# Patient Record
Sex: MALE | Race: WHITE | NOT HISPANIC OR LATINO | URBAN - METROPOLITAN AREA
[De-identification: names, ages, dates, MRNs, and addresses within clinical notes are randomized per-mention and may not be internally consistent; named-entity substitution may affect disease eponyms.]

---

## 2020-10-13 ENCOUNTER — OFFICE VISIT - HEALTHEAST (OUTPATIENT)
Dept: INTERNAL MEDICINE | Facility: CLINIC | Age: 40
End: 2020-10-13

## 2020-10-13 DIAGNOSIS — Z00.00 ROUTINE HISTORY AND PHYSICAL EXAMINATION OF ADULT: ICD-10-CM

## 2020-10-13 DIAGNOSIS — N50.812 TESTICULAR PAIN, LEFT: ICD-10-CM

## 2020-10-13 DIAGNOSIS — E66.3 OVERWEIGHT (BMI 25.0-29.9): ICD-10-CM

## 2020-10-13 ASSESSMENT — MIFFLIN-ST. JEOR: SCORE: 1850.09

## 2020-10-14 ENCOUNTER — AMBULATORY - HEALTHEAST (OUTPATIENT)
Dept: LAB | Facility: CLINIC | Age: 40
End: 2020-10-14

## 2020-10-14 DIAGNOSIS — E66.3 OVERWEIGHT (BMI 25.0-29.9): ICD-10-CM

## 2020-10-14 DIAGNOSIS — Z00.00 ROUTINE HISTORY AND PHYSICAL EXAMINATION OF ADULT: ICD-10-CM

## 2020-10-14 LAB
ALBUMIN SERPL-MCNC: 4.4 G/DL (ref 3.5–5)
ALP SERPL-CCNC: 86 U/L (ref 45–120)
ALT SERPL W P-5'-P-CCNC: 112 U/L (ref 0–45)
ANION GAP SERPL CALCULATED.3IONS-SCNC: 11 MMOL/L (ref 5–18)
AST SERPL W P-5'-P-CCNC: 45 U/L (ref 0–40)
BILIRUB SERPL-MCNC: 1 MG/DL (ref 0–1)
BUN SERPL-MCNC: 17 MG/DL (ref 8–22)
CALCIUM SERPL-MCNC: 9.8 MG/DL (ref 8.5–10.5)
CHLORIDE BLD-SCNC: 105 MMOL/L (ref 98–107)
CHOLEST SERPL-MCNC: 179 MG/DL
CO2 SERPL-SCNC: 25 MMOL/L (ref 22–31)
CREAT SERPL-MCNC: 1 MG/DL (ref 0.7–1.3)
ERYTHROCYTE [DISTWIDTH] IN BLOOD BY AUTOMATED COUNT: 10.8 % (ref 11–14.5)
FASTING STATUS PATIENT QL REPORTED: YES
GFR SERPL CREATININE-BSD FRML MDRD: >60 ML/MIN/1.73M2
GLUCOSE BLD-MCNC: 96 MG/DL (ref 70–125)
HBA1C MFR BLD: 4.8 %
HCT VFR BLD AUTO: 43.8 % (ref 40–54)
HDLC SERPL-MCNC: 50 MG/DL
HGB BLD-MCNC: 15.1 G/DL (ref 14–18)
LDLC SERPL CALC-MCNC: 107 MG/DL
MCH RBC QN AUTO: 35.6 PG (ref 27–34)
MCHC RBC AUTO-ENTMCNC: 34.6 G/DL (ref 32–36)
MCV RBC AUTO: 103 FL (ref 80–100)
PLATELET # BLD AUTO: 168 THOU/UL (ref 140–440)
PMV BLD AUTO: 7.7 FL (ref 7–10)
POTASSIUM BLD-SCNC: 4.1 MMOL/L (ref 3.5–5)
PROT SERPL-MCNC: 7.1 G/DL (ref 6–8)
RBC # BLD AUTO: 4.26 MILL/UL (ref 4.4–6.2)
SODIUM SERPL-SCNC: 141 MMOL/L (ref 136–145)
TRIGL SERPL-MCNC: 112 MG/DL
WBC: 5.3 THOU/UL (ref 4–11)

## 2020-10-15 ENCOUNTER — COMMUNICATION - HEALTHEAST (OUTPATIENT)
Dept: INTERNAL MEDICINE | Facility: CLINIC | Age: 40
End: 2020-10-15

## 2020-10-20 ENCOUNTER — COMMUNICATION - HEALTHEAST (OUTPATIENT)
Dept: INTERNAL MEDICINE | Facility: CLINIC | Age: 40
End: 2020-10-20

## 2021-06-05 VITALS
SYSTOLIC BLOOD PRESSURE: 134 MMHG | HEART RATE: 96 BPM | HEIGHT: 70 IN | TEMPERATURE: 98.5 F | DIASTOLIC BLOOD PRESSURE: 86 MMHG | BODY MASS INDEX: 29.35 KG/M2 | WEIGHT: 205 LBS | OXYGEN SATURATION: 97 %

## 2021-06-12 NOTE — TELEPHONE ENCOUNTER
Question following Office Visit  When did you see your provider: 10/13/20  What is your question: Patient would like to speak directly to Dr. Watkins about any lab results concerns.  Okay to leave a detailed message: Yes

## 2021-06-12 NOTE — PROGRESS NOTES
Office Visit - New Patient   Jake Hopper   40 y.o.  male    Date of visit: 10/13/2020  Physician: Gerson Watkins MD     Assessment and Plan   1. Routine history and physical examination of adult  2. Overweight (BMI 25.0-29.9)  - HM2(CBC w/o Differential); Future  - Comprehensive Metabolic Panel; Future  - Lipid Profile; Future  - Glycosylated Hemoglobin A1c; Future    3. Testicular pain, left  No visual or physical abnormality on exam. Low suspicion for torsion or hernia based on history and exam.  Counseled patient to first consider wearing briefs to provide support to his testicles. Malignancy is unlikely and referred pain remains on the differential.  If persists, may refer to urology, and will evaluate labs listed above.     Return if symptoms worsen or fail to improve.    Much or all of the text in this note was generated through the use of Dragon Dictate voice-to-text software. Errors in spelling or words which seem out of context are unintentional. Sound alike errors, in particular, may have escaped editing     Chief Complaint   Chief Complaint   Patient presents with     Annual Exam     not fasting - will get flu shot at pharmacy        Socioeconomic History     Marital status:      Smoking status: Current Some Day Smoker     Types: Cigarettes     Smokeless tobacco: Never Used     Tobacco comment: socially smokes   Substance and Sexual Activity     Alcohol use: Yes     Alcohol/week: 4.0 standard drinks     Types: 4 Standard drinks or equivalent per week     Frequency: 4 or more times a week     Drinks per session: 3 or 4     Binge frequency: Monthly     Drug use: Yes     Frequency: 5.0 times per week     Types: Marijuana     Sexual activity: Yes     Partners: Female     Birth control/protection: I.U.D.   Social History Narrative    . Chain smoker. 2 children. Works as a . For hobbies: [employer], plays [employer], playing video games.       Past Medical History   Patient  Active Problem List   Diagnosis     Overweight (BMI 25.0-29.9)     Past Medical History:   Diagnosis Date     Overweight (BMI 25.0-29.9)        Past Surgical History  He has a past surgical history that includes Benoit tooth extraction (Bilateral).  Family History   Problem Relation Age of Onset     Skin cancer Father      Breast cancer Paternal Grandmother         History of Present Illness   This 40 y.o. old male. Feels safe at home. Always wears a seatbelt. Main concern is  and MSK related. States there is a spot in his left lower back for the last 6 months. 1/10, periodic. Comfortable clothing and not having a belt makes it worse. Endorses about 30 lb weight gain, presumable due to less activity. Children are in their early teens. Endorses periodic left testicle discomfort, which is worse later in the day. He does not tend to wear underwear regularly. Notes that the testicle feels like it is riding. There is an associated pain in the L testicle and that there is a mild pain around the perineum. He has never seen a Urologist. He denies any concern from regular LUDA. No recent f/s/c, LE edema, no N/T in the fingers or toes.  No penile discharge or dysuria.    Review of Systems: A comprehensive review of systems was negative except as noted.     Medications and Allergies   No current outpatient medications on file.     No current facility-administered medications for this visit.      Allergies   Allergen Reactions     Thimerosal Hives and Itching        Family and Social History   Family History   Problem Relation Age of Onset     Skin cancer Father      Breast cancer Paternal Grandmother         Social History     Tobacco Use     Smoking status: Current Some Day Smoker     Types: Cigarettes     Smokeless tobacco: Never Used     Tobacco comment: socially smokes   Substance Use Topics     Alcohol use: Yes     Alcohol/week: 4.0 standard drinks     Types: 4 Standard drinks or equivalent per week     Frequency: 4 or  "more times a week     Drinks per session: 3 or 4     Binge frequency: Monthly     Drug use: Yes     Frequency: 5.0 times per week     Types: Marijuana        Physical Exam   /86 (Patient Site: Left Arm, Patient Position: Sitting, Cuff Size: Adult Regular)   Pulse 96   Temp 98.5  F (36.9  C) (Tympanic)   Ht 5' 10.25\" (1.784 m)   Wt 205 lb (93 kg)   SpO2 97%   BMI 29.21 kg/m    GENERAL APPEARANCE: Pleasant, nontoxic-appearing, NAD, alert and oriented x4  EYES: Eyelids, conjunctiva, and sclera were normal. Pupils were normal.    HEAD, EARS, NOSE, MOUTH, AND THROAT: Head normal. Hearing was normal to voice.    NECK: Neck appearance was normal.   RESPIRATORY: Bilaterally with no crackles, wheezing or rhonchi  CARDIOVASCULAR: Regular S1 and S2.  Radial pulses intact.  No edema.  GASTROINTESTINAL: NABS, soft, NT, ND, no HSM  MUSCULOSKELETAL: Skeletal configuration was normal and muscle mass was normal for age. Joint appearance was overall normal.  SKIN/HAIR/NAILS: Skin color was normal. No moles on torso or arms. Hair and nails were normal.   NEUROLOGIC: Alert and oriented to person, place, time, and circumstance. Speech was normal. Motor strength was normal for age   PSYCHIATRIC:  Mood and affect were normal   GENITOURINARY: There is no scrotal swelling, palpable testicular nodule, skin abnormalities throughout the scrotum, inguinal adenopathy, hydrocele/varicocele.  No cryptorchidism.  No inguinal adenopathy.     Additional Information   Time: total time spent with the patient was 30 minutes of which >50% was spent in counseling and coordination of care     Gerson Watkins MD  Internal Medicine  Contact me at [phone]  "

## 2021-06-18 NOTE — PATIENT INSTRUCTIONS - HE
Patient Instructions by Gerson Watkins MD at 10/13/2020 10:20 AM     Author: Gerson Watkins MD Service: -- Author Type: Physician    Filed: 10/13/2020 11:21 AM Encounter Date: 10/13/2020 Status: Signed    : Gerson Watkins MD (Physician)         Established High Blood Pressure    High blood pressure (hypertension) is a chronic disease. Often, healthcare providers dont know what causes it. But it can be caused by certain health conditions and medicines.  If you have high blood pressure, you may not have any symptoms. If you do have symptoms, they may include headache, dizziness, changes in your vision, chest pain, and shortness of breath. But even without symptoms, high blood pressure thats not treated raises your risk for heart attack, heart failure, and stroke. High blood pressure is a serious health risk and shouldnt be ignored.  Blood pressure measurements are given as 2 numbers. Systolic blood pressure is the upper number. This is the pressure when the heart contracts. Diastolic blood pressure is the lower number. This is the pressure when the heart relaxes between beats. You will see your blood pressure readings written together. For example, a person with a systolic pressure of 118 and a diastolic pressure of 78 will have 118/78 written in the medical record.  Blood pressure is categorized as normal, elevated, or stage 1 or stage 2 high blood pressure:    Normal blood pressure is systolic of less than 120 and diastolic of less than 80 (120/80)    Elevated blood pressure is systolic of 120 to 129 and diastolic less than 80    Stage 1 high blood pressure is systolic is 130 to 139 or diastolic between 80 to 89    Stage 2 high blood pressure is when systolic is 140 or higher or the diastolic is 90 or higher  Home care  If you have high blood pressure, follow these home care guidelines to help lower your blood pressure. If you are taking medicines for high blood pressure,  these methods may reduce or end your need for medicines in the future.    Start a weight-loss program if you are overweight.    Cut back on how much salt you get in your diet. Heres how to do this:  ? Dont eat foods that have a lot of salt. These include olives, pickles, smoked meats, and salted potato chips.  ? Dont add salt to your food at the table.  ? Use only small amounts of salt when cooking.    Start an exercise program. Talk with your healthcare provider about the type of exercise program that would be best for you. It doesn't have to be hard. Even brisk walking for 20 minutes 3 times a week is a good form of exercise.    Dont take medicines that stimulate the heart. This includes many over-the-counter cold and sinus decongestant pills and sprays, as well as diet pills. Check the warnings about high blood pressure on the label. Before buying any over-the-counter medicines or supplements, always ask the pharmacist about the product's potential interaction with your high blood pressure and your high blood pressure medicines.    Stimulants such as amphetamine or cocaine could be deadly for someone with high blood pressure. Never take these.    Limit how much caffeine you get in your diet. Switch to caffeine-free products.    Stop smoking. If you are a long-time smoker, this can be hard. Talk to your healthcare provider about medicines and nicotine replacement options to help you. Also, enroll in a stop-smoking program to make it more likely that you will quit for good.    Learn how to handle stress. This is an important part of any program to lower blood pressure. Learn about relaxation methods like meditation, yoga, or biofeedback.    If your provider prescribed medicines, take them exactly as directed. Missing doses may cause your blood pressure get out of control.    If you miss a dose or doses, check with your healthcare provider or pharmacist about what to do.    Consider buying an automatic blood  pressure machine to check your blood pressure at home. Ask your provider for a recommendation. You can get one of these at most pharmacies.     The American Heart Association recommends the following guidelines for home blood pressure monitoring:    Don't smoke or drink coffee for 30 minutes before taking your blood pressure.    Go to the bathroom before the test.    Relax for 5 minutes before taking the measurement.    Sit with your back supported (don't sit on a couch or soft chair); keep your feet on the floor uncrossed. Place your arm on a solid flat surface (like a table) with the upper part of the arm at heart level. Place the middle of the cuff directly above the bend of the elbow. Check the monitor's instruction manual for an illustration.    Take multiple readings. When you measure, take 2 to 3 readings one minute apart and record all of the results.    Take your blood pressure at the same time every day, or as your healthcare provider recommends.    Record the date, time, and blood pressure reading.    Take the record with you to your next medical appointment. If your blood pressure monitor has a built-in memory, simply take the monitor with you to your next appointment.    Call your provider if you have several high readings. Don't be frightened by a single high blood pressure reading, but if you get several high readings, check in with your healthcare provider.    Note: When blood pressure reaches a systolic (top number) of 180 or higher OR diastolic (bottom number) of 110 or higher, seek emergency medical treatment.  Follow-up care  You will need to see your healthcare provider regularly. This is to check your blood pressure and to make changes to your medicines. Make a follow-up appointment as directed. Bring the record of your home blood pressure readings to the appointment.  When to seek medical advice  Call your healthcare provider right away if any of these occur:    Blood pressure reaches a  systolic (upper number) of 180 or higher OR a diastolic (bottom number) of 110 or higher    Chest pain or shortness of breath    Severe headache    Throbbing or rushing sound in the ears    Nosebleed    Sudden severe pain in your belly (abdomen)    Extreme drowsiness, confusion, or fainting    Dizziness or spinning sensation (vertigo)    Weakness of an arm or leg or one side of the face    You have problems speaking or seeing

## 2021-06-21 NOTE — LETTER
Letter by Gerson Watkins MD at      Author: Gerson Watkins MD Service: -- Author Type: --    Filed:  Encounter Date: 10/20/2020 Status: (Other)         Jake Hopper  [address] [facility] [address]     October 20, 2020     Dear Mr. Hopper,    Below are the results from your recent visit:    Resulted Orders   HM2(CBC w/o Differential)   Result Value Ref Range    WBC 5.3 4.0 - 11.0 thou/uL    RBC 4.26 (L) 4.40 - 6.20 mill/uL    Hemoglobin 15.1 14.0 - 18.0 g/dL    Hematocrit 43.8 40.0 - 54.0 %     (H) 80 - 100 fL    MCH 35.6 (H) 27.0 - 34.0 pg    MCHC 34.6 32.0 - 36.0 g/dL    RDW 10.8 (L) 11.0 - 14.5 %    Platelets 168 140 - 440 thou/uL    MPV 7.7 7.0 - 10.0 fL   Comprehensive Metabolic Panel   Result Value Ref Range    Sodium 141 136 - 145 mmol/L    Potassium 4.1 3.5 - 5.0 mmol/L    Chloride 105 98 - 107 mmol/L    CO2 25 22 - 31 mmol/L    Anion Gap, Calculation 11 5 - 18 mmol/L    Glucose 96 70 - 125 mg/dL    BUN 17 8 - 22 mg/dL    Creatinine 1.00 0.70 - 1.30 mg/dL    GFR MDRD Af Amer >60 >60 mL/min/1.73m2    GFR MDRD Non Af Amer >60 >60 mL/min/1.73m2    Bilirubin, Total 1.0 0.0 - 1.0 mg/dL    Calcium 9.8 8.5 - 10.5 mg/dL    Protein, Total 7.1 6.0 - 8.0 g/dL    Albumin 4.4 3.5 - 5.0 g/dL    Alkaline Phosphatase 86 45 - 120 U/L    AST 45 (H) 0 - 40 U/L     (H) 0 - 45 U/L    Narrative    Fasting Glucose reference range is 70-99 mg/dL per  American Diabetes Association (ADA) guidelines.   Lipid Profile   Result Value Ref Range    Triglycerides 112 <=149 mg/dL    Cholesterol 179 <=199 mg/dL    LDL Calculated 107 <=129 mg/dL    HDL Cholesterol 50 >=40 mg/dL    Patient Fasting > 8hrs? Yes    Glycosylated Hemoglobin A1c   Result Value Ref Range    Hemoglobin A1c 4.8 <=5.6 %      Comment:      Normal <5.7% Prediabete 5.7-6.4% Diabletes 6.5% or higher - adopted from ADA consensus guidelines     Your kidney and liver labs are normal.  Your elevated ALT and AST are indicative of  liver enzymes being released in the setting of increased alcohol intake and can be addressed by decreasing alclhol intake.   Your cholesterol labs look wonderful and your blood counts are also completely normal.  Your A1c of 4.8% is normal but/no concern of diabetes.  I looked further into your testicular complaints and the history added in clinic in combination with the exam are not suggestive of more common and concerning issues such as testicular torsion, epididymitis, orchitis, cancer or hernia.  I hope his symptoms have improved with wearing supportive briefs/underwear, and if not please let us know so potentially refer you to a urologist for further evaluation.  Please call with questions or contact us using [employer].    Sincerely,    Electronically signed by Gerson Watkins MD